# Patient Record
Sex: MALE | Race: WHITE
[De-identification: names, ages, dates, MRNs, and addresses within clinical notes are randomized per-mention and may not be internally consistent; named-entity substitution may affect disease eponyms.]

---

## 2018-01-22 ENCOUNTER — HOSPITAL ENCOUNTER (EMERGENCY)
Dept: HOSPITAL 25 - UCCORT | Age: 28
Discharge: HOME | End: 2018-01-22
Payer: COMMERCIAL

## 2018-01-22 VITALS — DIASTOLIC BLOOD PRESSURE: 111 MMHG | SYSTOLIC BLOOD PRESSURE: 175 MMHG

## 2018-01-22 DIAGNOSIS — R05: ICD-10-CM

## 2018-01-22 DIAGNOSIS — I10: ICD-10-CM

## 2018-01-22 DIAGNOSIS — J02.9: ICD-10-CM

## 2018-01-22 DIAGNOSIS — R50.9: ICD-10-CM

## 2018-01-22 DIAGNOSIS — J20.9: Primary | ICD-10-CM

## 2018-01-22 PROCEDURE — 87651 STREP A DNA AMP PROBE: CPT

## 2018-01-22 PROCEDURE — G0463 HOSPITAL OUTPT CLINIC VISIT: HCPCS

## 2018-01-22 PROCEDURE — 87502 INFLUENZA DNA AMP PROBE: CPT

## 2018-01-22 PROCEDURE — 99212 OFFICE O/P EST SF 10 MIN: CPT

## 2018-01-22 NOTE — UC
Respiratory Complaint HPI





- HPI Summary


HPI Summary: 





28 y/o male presents to the  urgent care c/o productive cough, nasal congestion

, fever, since Saturday 1/20/2018. Pt reports states symptoms started with 

nasal congestion, then the cough started. Pt has taking Nyquill to alleviate 

symptoms. Pt states he has PMHX of HTN on diet control. Cough is productive 

with a yellowish phlegm. He states some co-workers are sick too. Pt denies SOB 

, dizziness, chest pain, HA, abdominal pain, N/V/D. 











- History of Current Complaint


Chief Complaint: UCGeneralIllness


Stated Complaint: COUGH,CONGESTION


Time Seen by Provider: 01/22/18 21:17


Hx Obtained From: Patient


Onset/Duration: Gradual Onset, Lasting Days - 3 days, Still Present, Worse 

Since - today


Timing: Intermittent Episodes


Severity Initially: Mild


Severity Currently: Moderate


Pain Intensity: 5 - sore throat


Pain Scale Used: 0-10 Numeric


Character: Cough: Productive, Sputum Description: - yellowish


Aggravating Factors: Recumbent Position


Alleviating Factors: OTC Meds


Associated Signs And Symptoms: Positive: Fever, Chills, URI, Nasal Congestion, 

Sinus Discomfort





- Risk Factors


Pulmonary Embolism Risk Factors: Negative


Cardiac Risk Factors: Negative


Pseudomonas Risk Factors: Negative


Tuberculosis Risk Factors: Negative





- Allergies/Home Medications


Allergies/Adverse Reactions: 


 Allergies











Allergy/AdvReac Type Severity Reaction Status Date / Time


 


No Known Allergies Allergy   Verified 01/22/18 20:59














PMH/Surg Hx/FS Hx/Imm Hx


Previously Healthy: Yes


Cardiovascular History: Hypertension - diet control





- Surgical History


Surgical History: Yes


Surgery Procedure, Year, and Place: CYST REMOVED





- Family History


Known Family History: Positive: Hypertension





- Social History


Occupation: Employed Full-time


Lives: With Family


Alcohol Use: Occasionally


Substance Use Type: None


Smoking Status (MU): Never Smoked Tobacco





- Immunization History


Most Recent Influenza Vaccination: no





Review of Systems


Constitutional: Fever, Chills


Skin: Negative


Eyes: Negative


ENT: Sore Throat, Nasal Discharge, Sinus Congestion


Respiratory: Cough - productive


Cardiovascular: Negative


Gastrointestinal: Negative


Genitourinary: Negative


Motor: Negative


Neurovascular: Negative


Musculoskeletal: Negative


Neurological: Headache


Is Patient Immunocompromised?: No


All Other Systems Reviewed And Are Negative: Yes





Physical Exam


Triage Information Reviewed: Yes


Vital Signs: 


 Initial Vital Signs











Temp  97.4 F   01/22/18 20:57


 


Pulse  118   01/22/18 20:57


 


Resp  16   01/22/18 20:57


 


BP  177/110   01/22/18 20:57


 


Pulse Ox  99   01/22/18 20:57














- Additional Comments


Vital Signs Reviewed: Yes


General: well developed, well nourished male sitting in the examining table w/o 

any apparent distress


Eyes: Positive: Conjunctiva Clear - PERRLA, EOMI, fundi grossly normal


ENT: Positive: Normal ENT inspection, Hearing grossly normal, Pharynx normal, 

Nasal congestion - edematous and erythematous nasal mucosa, Nasal drainage - 

yellowish drainage, TMs normal.  Negative: Tonsillar swelling, Tonsillar exudate


Neck: Positive: Supple, Nontender, No Lymphadenopathy


Respiratory: no orthopnea or dyspnea.  Able to speak in full sentences, no 

retractions or accessory muscle use, no tripod position, stridor, or head 

bobbing.  Positive breath sounds, mild posterior upper lung with mild rhonchi, 

no lung wheezes, crackles, or  rales.


Cardiovascular: Positive: RRR, No Murmur, Pulses Normal, Brisk Capillary Refill


Abdomen Description: Positive: Nontender, No Organomegaly, Soft.  Negative: CVA 

Tenderness (R), CVA Tenderness (L)


Bowel Sounds: Positive: Present


Musculoskeletal Exam: Normal


Musculoskeletal: Positive: Strength Intact, ROM Intact, No Edema


Neurological Exam: Normal


Psychological Exam: Normal


Skin Exam: Normal











UC Diagnostic Evaluation





- Laboratory


O2 Sat by Pulse Oximetry: 99





Respiratory Course/Dx





- Course


Course Of Treatment: 28 y/o male presents to the  urgent care c/o productive 

cough, nasal congestion, fever, since Saturday 1/20/2018. Pt reports states 

symptoms started with nasal congestion, then the cough started. Pt has taking 

Nyquill to alleviate symptoms. Pt states he has PMHX of HTN on diet control. 

Cough is productive with a yellowish phlegm. He states some co-workers are sick 

too. Pt denies SOB , dizziness, chest pain, HA, abdominal pain, N/V/D. Hx 

obtained. Pt with mild rhonchi at left  posterior upper lung on examination. Pt 

with Acute bronchitis on examination. Rapid strep ordered: result: negative, 

Influenza A&B ordered, result: negative. Pt Rx Z-ephraim PO and tessalon tabs PO 

for bronchitis. First dose of Z-ephraim given at the clinic since pharmacy is close 

now.  Pt's BP is elevated today advised to decrease salt in diet, monitor BP 

and f/u with PCP for further management. He was educated on HTN and advised if 

he develops chest pain , SOB, dizziness to go immediately to the ER for further 

management. if not improvement or worsening of symptoms to f/u with PCP for 

further management. Pt understood and agreed with plan of care





- Differential Dx/Diagnosis


Differential Diagnosis/HQI/PQRI: Asthma, Bronchitis, Influenza, Laryngitis, 

Lower Resp Infection, Sinusitis, Other - pharyngitis, pneumonia


Provider Diagnoses: 1- Acute Bronchitis.  1- Uncontrolled HTN





Discharge





- Discharge Plan


Condition: Stable


Disposition: HOME


Prescriptions: 


Azithromycin TAB* [Zithromax TAB (Z-EPHRAIM) 250 mg #6 tabs] 250 mg PO DAILY #4 tab


Benzonatate CAP* [Tessalon 100 MG CAP*] 100 mg PO TID PRN #21 cap


 PRN Reason: Cough


Patient Education Materials:  Acute Bronchitis (ED), Low-Sodium Diet (ED)


Forms:  *Work Release


Referrals: 


[facility] [place] [facility] [place] - 3 [place]


Additional Instructions: 


1-Please take full course of antibiotic to avoid resistance. 


2-Take Tessalon PO  tabs as directed  to alleviate cough.  Increase fluid intake

, rest and eat well. 


3- If symptoms do not improve or worsen or your develop SOB with fever and 

severe wheezing please go immediately to the ER further evaluation and 

treatment.


4- F/u with your PCP in 2-3 days for further management.


5- Your BP is elevated today. please decrease salt in your diet, monitor BP and 

if it continues to be elevated please f/u with your PCP for further management

## 2018-06-26 ENCOUNTER — HOSPITAL ENCOUNTER (EMERGENCY)
Dept: HOSPITAL 25 - UCCORT | Age: 28
Discharge: HOME | End: 2018-06-26
Payer: COMMERCIAL

## 2018-06-26 VITALS — DIASTOLIC BLOOD PRESSURE: 101 MMHG | SYSTOLIC BLOOD PRESSURE: 161 MMHG

## 2018-06-26 DIAGNOSIS — J01.90: Primary | ICD-10-CM

## 2018-06-26 DIAGNOSIS — H66.91: ICD-10-CM

## 2018-06-26 PROCEDURE — G0463 HOSPITAL OUTPT CLINIC VISIT: HCPCS

## 2018-06-26 PROCEDURE — 99212 OFFICE O/P EST SF 10 MIN: CPT

## 2018-06-26 NOTE — UC
Respiratory Complaint HPI





- HPI Summary


HPI Summary: 





29 yo male with one week hx of sinus pressure and pain


nasal congestion


no fever/chills


now with right otalgia and decreased hearing











- History of Current Complaint


Chief Complaint: UCGeneralIllness


Stated Complaint: SINUS


Time Seen by Provider: 06/26/18 19:02


Hx Obtained From: Patient


Onset/Duration: Gradual Onset


Timing: Constant


Severity Initially: Mild


Severity Currently: Mild


Pain Intensity: 3


Pain Scale Used: 0-10 Numeric


Character: Cough: Nonproductive


Aggravating Factors: Allergens


Alleviating Factors: Nothing


Associated Signs And Symptoms: Positive: URI, Nasal Congestion





- Allergies/Home Medications


Allergies/Adverse Reactions: 


 Allergies











Allergy/AdvReac Type Severity Reaction Status Date / Time


 


No Known Allergies Allergy   Verified 01/22/18 20:59











Home Medications: 


 Home Medications





Dm/Acetaminophen/Doxylamine [Night Cold-Flu Relief Liq Gel] 1 each PO DAILY 06/ 26/18 [History Confirmed 06/26/18]











PMH/Surg Hx/FS Hx/Imm Hx


Previously Healthy: Yes


Cardiovascular History: Hypertension - not on meds currently





- Surgical History


Surgical History: Yes


Surgery Procedure, Year, and Place: CYST REMOVED





- Family History


Known Family History: Positive: Hypertension





- Social History


Alcohol Use: Occasionally


Substance Use Type: None


Smoking Status (MU): Never Smoked Tobacco





- Immunization History


Most Recent Influenza Vaccination: no





Review of Systems


Constitutional: Negative


Skin: Negative


Eyes: Negative


ENT: Ear Ache, Nasal Discharge, Sinus Congestion, Sinus Pain/Tenderness


Respiratory: Cough


Cardiovascular: Negative


Gastrointestinal: Negative


Genitourinary: Negative


Motor: Negative


Neurovascular: Negative


Musculoskeletal: Negative


Neurological: Negative


Psychological: Negative


Is Patient Immunocompromised?: No


All Other Systems Reviewed And Are Negative: Yes





Physical Exam


Triage Information Reviewed: Yes


Appearance: Well-Appearing, No Pain Distress, Well-Nourished


Vital Signs: 


 Initial Vital Signs











Temp  98.2 F   06/26/18 18:39


 


Pulse  98   06/26/18 18:39


 


Resp  15   06/26/18 18:39


 


BP  161/101   06/26/18 18:39


 


Pulse Ox  98   06/26/18 18:39











Vital Signs Reviewed: Yes


Eyes: Positive: Conjunctiva Clear


ENT: Positive: Nasal congestion, Nasal drainage, TM bulging - R, TM red - R, 

Sinus tenderness.  Negative: Hearing grossly normal, TMs normal


Neck: Positive: Supple, Nontender, No Lymphadenopathy


Respiratory: Positive: Lungs clear, Normal breath sounds, No respiratory 

distress


Cardiovascular: Positive: RRR, No Murmur


Musculoskeletal: Positive: ROM Intact, No Edema


Neurological: Positive: Alert


Psychological Exam: Normal


Skin Exam: Normal





UC Diagnostic Evaluation





- Laboratory


O2 Sat by Pulse Oximetry: 98





Respiratory Course/Dx





- Differential Dx/Diagnosis


Provider Diagnoses: acute sinusitis.  right otitis media





Discharge





- Sign-Out/Discharge


Documenting (check all that apply): Discharge/Admit/Transfer





- Discharge Plan


Condition: Stable


Disposition: HOME


Prescriptions: 


Amoxicillin PO (*) [Amoxicillin 875 MG (*)] 875 mg PO BID #20 tab


Fluticasone NASAL SPRAY 50MCG* [Flonase NASAL SPRAY 50MCG*] 2 spray BOTH NARES 

BID #1 btl


Patient Education Materials:  Sinusitis (ED), Ear Infection (ED)


Referrals: 


No Primary Care Phys,NOPCP [Primary Care Provider] - 


Additional Instructions: 


recheck in 2 weeks if not better





- Billing Disposition and Condition


Condition: STABLE


Disposition: Home

## 2019-02-26 ENCOUNTER — HOSPITAL ENCOUNTER (EMERGENCY)
Dept: HOSPITAL 25 - UCCORT | Age: 29
Discharge: HOME | End: 2019-02-26
Payer: COMMERCIAL

## 2019-02-26 VITALS — SYSTOLIC BLOOD PRESSURE: 168 MMHG | DIASTOLIC BLOOD PRESSURE: 103 MMHG

## 2019-02-26 DIAGNOSIS — R09.81: ICD-10-CM

## 2019-02-26 DIAGNOSIS — R03.0: ICD-10-CM

## 2019-02-26 DIAGNOSIS — J02.9: Primary | ICD-10-CM

## 2019-02-26 PROCEDURE — 99212 OFFICE O/P EST SF 10 MIN: CPT

## 2019-02-26 PROCEDURE — G0463 HOSPITAL OUTPT CLINIC VISIT: HCPCS

## 2019-02-26 NOTE — UC
Throat Pain/Nasal Hector HPI





- HPI Summary


HPI Summary: 





sore throat x 1 day 


mild nasal congestion, mild cough 


+fever, chills, body aches 


no n/v/d/c , no abdominal pain 





- History of Current Complaint


Chief Complaint: UCGeneralIllness


Stated Complaint: SORE THROAT,CONGESTION,EARS


Time Seen by Provider: 02/26/19 07:34


Hx Obtained From: Patient


Onset/Duration: Gradual Onset, Lasting Days - 1, Still Present


Severity: Moderate


Pain Intensity: 5


Cough: Nonproductive


Associated Signs & Symptoms: Positive: Fever.  Negative: Dysphagia, FB Sensation

, Drooling, Wheezing, Hoarseness, Sinus Discomfort, Nasal Discharge, Vomiting, 

Rash





- Allergies/Home Medications


Allergies/Adverse Reactions: 


 Allergies











Allergy/AdvReac Type Severity Reaction Status Date / Time


 


No Known Allergies Allergy   Verified 02/26/19 07:33














PMH/Surg Hx/FS Hx/Imm Hx


Previously Healthy: Yes





- Surgical History


Surgical History: Yes


Surgery Procedure, Year, and Place: CYST REMOVED





- Family History


Known Family History: Positive: Hypertension





- Social History


Alcohol Use: Rare


Substance Use Type: None


Smoking Status (MU): Never Smoked Tobacco





- Immunization History


Most Recent Influenza Vaccination: no





Review of Systems


All Other Systems Reviewed And Are Negative: Yes


Constitutional: Positive: Fever, Chills, Fatigue


Skin: Positive: Negative


Eyes: Positive: Negative


ENT: Positive: Sore Throat


Respiratory: Positive: Cough


Cardiovascular: Positive: Negative


Gastrointestinal: Positive: Negative


Is Patient Immunocompromised?: No





Physical Exam


Triage Information Reviewed: Yes


Appearance: Well-Appearing, Well-Nourished, Pain Distress


Vital Signs: 


 Initial Vital Signs











Temp  96.7 F   02/26/19 07:33


 


Pulse  125   02/26/19 07:33


 


Resp  20   02/26/19 07:33


 


BP  168/103   02/26/19 07:33


 


Pulse Ox  97   02/26/19 07:33











Vital Signs Reviewed: Yes


Eyes: Positive: Conjunctiva Clear


ENT: Positive: Normal ENT inspection, Hearing grossly normal, Pharyngeal 

erythema, Tonsillar swelling, Tonsillar exudate.  Negative: Trismus


Neck: Positive: Supple, Nontender, No Lymphadenopathy


Respiratory: Positive: Chest non-tender, Lungs clear, Normal breath sounds


Cardiovascular: Positive: Tachycardia


Abdominal Exam: Normal


Abdomen Description: Positive: Nontender


Skin Exam: Normal





Throat Pain/Nasal Course/Dx





- Differential Dx/Diagnosis


Provider Diagnosis: 


 Pharyngitis, Elevated BP without diagnosis of hypertension








Discharge





- Sign-Out/Discharge


Documenting (check all that apply): Patient Departure


All imaging exams completed and their final reports reviewed: No Studies





- Discharge Plan


Condition: Stable


Disposition: HOME


Prescriptions: 


Amoxicillin PO (*) [Amoxicillin 875 MG (*)] 875 mg PO BID #20 tab


Patient Education Materials:  Strep Throat (DC), Hypertension (ED)


Referrals: 


No Primary Care Phys,NOPCP [Primary Care Provider] - If Needed


Additional Instructions: 


please monitor your bp daily 


follow up with your pcp in one week 





- Billing Disposition and Condition


Condition: STABLE


Disposition: Home